# Patient Record
Sex: MALE | Race: WHITE | Employment: PART TIME
[De-identification: names, ages, dates, MRNs, and addresses within clinical notes are randomized per-mention and may not be internally consistent; named-entity substitution may affect disease eponyms.]

---

## 2017-05-10 PROBLEM — R52 INTRACTABLE PAIN: Status: ACTIVE | Noted: 2017-05-10

## 2017-09-22 PROBLEM — M79.7 FIBROMYALGIA: Status: ACTIVE | Noted: 2017-09-22

## 2018-03-05 PROBLEM — M47.26 OSTEOARTHRITIS OF SPINE WITH RADICULOPATHY, LUMBAR REGION: Status: ACTIVE | Noted: 2018-03-05

## 2018-03-05 PROBLEM — M47.22 OSTEOARTHRITIS OF SPINE WITH RADICULOPATHY, CERVICAL REGION: Status: ACTIVE | Noted: 2018-03-05

## 2018-05-01 PROBLEM — M47.22 OSTEOARTHRITIS OF SPINE WITH RADICULOPATHY, CERVICAL REGION: Status: ACTIVE | Noted: 2018-05-01

## 2022-04-05 ENCOUNTER — TELEPHONE (OUTPATIENT)
Dept: CASE MANAGEMENT | Age: 57
End: 2022-04-05

## 2022-04-05 VITALS — WEIGHT: 189 LBS | HEIGHT: 70 IN | BODY MASS INDEX: 27.06 KG/M2

## 2022-04-05 NOTE — TELEPHONE ENCOUNTER
I called the patient and he confirmed his CT lung screening at [facility] on 4/6/2022 at 2:30 pm.  I reminded the patient to arrive at 2:00 pm enter through the main entrance, and register. Patient confirmed.           Electronically signed by Belia Moyer on 4/5/22 at 1:48 PM EDT

## 2022-04-06 ENCOUNTER — HOSPITAL ENCOUNTER (OUTPATIENT)
Dept: CT IMAGING | Age: 57
Discharge: HOME | End: 2022-04-08
Payer: OTHER GOVERNMENT

## 2022-04-06 DIAGNOSIS — Z87.891 PERSONAL HISTORY OF TOBACCO USE: ICD-10-CM

## 2022-04-06 DIAGNOSIS — F17.210 CIGARETTE SMOKER: ICD-10-CM

## 2022-04-06 PROCEDURE — 71271 CT THORAX LUNG CANCER SCR C-: CPT

## 2022-04-07 ENCOUNTER — TELEPHONE (OUTPATIENT)
Dept: CASE MANAGEMENT | Age: 57
End: 2022-04-07

## 2022-04-07 NOTE — TELEPHONE ENCOUNTER
No call, encounter opened to process CT Lung Screening. CT Lung Screen: 4/6/2022    Impression   Emphysematous changes with a 3 mm right upper lobe pulmonary nodule.       Incidental findings as above, including coronary artery calcifications,   potentially a marker of coronary artery disease.       LUNG RADS:   Per ACR Lung-RADS Version 1.1       Category 2, Benign appearance or behavior.       Management:  Continue annual lung screening with LDCT in 12 months.       RECOMMENDATIONS:   If you would like to register your patient with the [facility], please contact the Nurse Navigator at   [phone]. Pack years: 22.5    Social History     Tobacco Use  Smoking Status: Current Every Day Smoker   Start Date: 0   Quit Date:    Types: Cigarettes   Packs/Day: 0.5   Years: 39   Pack Years: 22.5   Smokeless Tobacco: Never Used        Results letter sent to patient via my chart or mailed.      [facility]